# Patient Record
Sex: FEMALE | Race: WHITE | NOT HISPANIC OR LATINO | Employment: FULL TIME | URBAN - METROPOLITAN AREA
[De-identification: names, ages, dates, MRNs, and addresses within clinical notes are randomized per-mention and may not be internally consistent; named-entity substitution may affect disease eponyms.]

---

## 2020-01-02 ENCOUNTER — OFFICE VISIT (OUTPATIENT)
Dept: SURGERY | Facility: CLINIC | Age: 40
End: 2020-01-02
Payer: MEDICARE

## 2020-01-02 ENCOUNTER — TELEPHONE (OUTPATIENT)
Dept: GASTROENTEROLOGY | Facility: CLINIC | Age: 40
End: 2020-01-02

## 2020-01-02 VITALS
HEIGHT: 65 IN | DIASTOLIC BLOOD PRESSURE: 96 MMHG | WEIGHT: 238 LBS | SYSTOLIC BLOOD PRESSURE: 141 MMHG | HEART RATE: 88 BPM | BODY MASS INDEX: 39.65 KG/M2

## 2020-01-02 DIAGNOSIS — Z96.89 PRESENCE OF GASTRIC PACEMAKER: ICD-10-CM

## 2020-01-02 DIAGNOSIS — R10.84 GENERALIZED ABDOMINAL PAIN: Primary | ICD-10-CM

## 2020-01-02 DIAGNOSIS — R11.0 NAUSEA: ICD-10-CM

## 2020-01-02 PROCEDURE — 99999 PR PBB SHADOW E&M-NEW PATIENT-LVL III: ICD-10-PCS | Mod: PBBFAC,,, | Performed by: SURGERY

## 2020-01-02 PROCEDURE — 99203 OFFICE O/P NEW LOW 30 MIN: CPT | Mod: PBBFAC | Performed by: SURGERY

## 2020-01-02 PROCEDURE — 99999 PR PBB SHADOW E&M-NEW PATIENT-LVL III: CPT | Mod: PBBFAC,,, | Performed by: SURGERY

## 2020-01-02 PROCEDURE — 99204 OFFICE O/P NEW MOD 45 MIN: CPT | Mod: S$PBB,,, | Performed by: SURGERY

## 2020-01-02 PROCEDURE — 99204 PR OFFICE/OUTPT VISIT, NEW, LEVL IV, 45-59 MIN: ICD-10-PCS | Mod: S$PBB,,, | Performed by: SURGERY

## 2020-01-02 RX ORDER — ESZOPICLONE 3 MG/1
3 TABLET, FILM COATED ORAL
COMMUNITY

## 2020-01-02 RX ORDER — CETIRIZINE HYDROCHLORIDE 10 MG/1
10 TABLET ORAL
COMMUNITY

## 2020-01-02 RX ORDER — OXYCODONE AND ACETAMINOPHEN 10; 325 MG/1; MG/1
1 TABLET ORAL
COMMUNITY

## 2020-01-02 RX ORDER — ONDANSETRON 4 MG/1
4 TABLET, FILM COATED ORAL
COMMUNITY

## 2020-01-02 RX ORDER — ATORVASTATIN CALCIUM 20 MG/1
20 TABLET, FILM COATED ORAL
COMMUNITY

## 2020-01-02 RX ORDER — PREGABALIN 75 MG/1
2 CAPSULE ORAL
COMMUNITY
Start: 2018-04-20

## 2020-01-02 RX ORDER — PREGABALIN 150 MG/1
150 CAPSULE ORAL
COMMUNITY

## 2020-01-02 RX ORDER — TIZANIDINE 2 MG/1
2 TABLET ORAL
COMMUNITY

## 2020-01-02 RX ORDER — PROMETHAZINE HYDROCHLORIDE 25 MG/1
25 TABLET ORAL
COMMUNITY

## 2020-01-02 RX ORDER — TIZANIDINE 4 MG/1
4 TABLET ORAL
COMMUNITY

## 2020-01-02 NOTE — TELEPHONE ENCOUNTER
Spoke with pt and scheduled her appt with the dietitian on 01/07/2020 at 2PM. Informed pt that the dietitian is now located on the first floor by infectious disease. Pt expressed understanding.

## 2020-01-02 NOTE — Clinical Note
January 2, 2020      Cresencio Freeman MD  [address] [place] [address]   [facility]  [place] [address]           [facility]  [address]  [place] [address]  Phone: [phone]          Patient: Marilu Galarza   MR Number: [id number]   YOB: 1980   Date of Visit: 1/2/2020       Dear Dr. Cresencio Freeman:    Thank you for referring Marilu Galarza to me for evaluation. Attached you will find relevant portions of my assessment and plan of care.    If you have questions, please do not hesitate to call me. I look forward to following Marilu Galarza along with you.    Sincerely,    Librado Adams MD    Enclosure  CC:  No Recipients    If you would like to receive this communication electronically, please contact [email or web address] or [phone] to request more information on [employer] access.    For providers and/or their staff who would like to refer a patient to [facility], please contact us through our one-stop-shop provider referral line, [facility] , at [phone].    If you feel you have received this communication in error or would no longer like to receive these types of communications, please e-mail [email or web address]

## 2020-01-02 NOTE — PROGRESS NOTES
History & Physical    SUBJECTIVE:     History of Present Illness:  Patient is a 39 y.o. female with gastroparesis who initially failed medical management and subsequently underwent gastric stimulator placement in 2016 after which her symptoms improved significantly. She did not have any issues until middle of last year when she required a battery exchange, since which she has had many difficulties and return of her symptoms. Specifically she reports significant chronic abdominal pain, nausea, and early satiety as well as bloating, she does not have emesis if she takes her zofran and phenergan. She takes phenergan daily while she takes zofran every other day. She has not had any ED visits or hospitalizations for her symptoms. She does take chronic pain medications, she is on oxycodone daily. She has a surgical hx (in addition to the aforementioned) significant for cholecystectomy, LIYAH/BSO, and multiple back surgeries. Denies any other significant medical history.       Severity Frequency  Vomitin  4  Nausea 2  4  Early Satiety 4  4  Bloating 4  4  Postprandial 4  4  Fulness   Epigastric 4  4  Pain   Epigastric 4  4   Burning    Chief Complaint   Patient presents with    Consult     gastroparesis       Review of patient's allergies indicates:   Allergen Reactions    Nsaids (non-steroidal anti-inflammatory drug) Other (See Comments)     GI bleeding      Aspirin Other (See Comments)     GI bleeding  GI bleed       Hydrocodone Nausea And Vomiting    Tramadol Nausea And Vomiting    Ketorolac tromethamine Nausea And Vomiting     Nausea and vomiting         Current Outpatient Medications   Medication Sig Dispense Refill    pregabalin (LYRICA) 75 MG capsule Take 2 capsules by mouth.      atorvastatin (LIPITOR) 20 MG tablet Take 20 mg by mouth.      cetirizine (ZYRTEC) 10 MG tablet Take 10 mg by mouth.      eszopiclone (LUNESTA) 3 mg Tab Take 3 mg by mouth.      ondansetron (ZOFRAN) 4 MG tablet Take 4 mg by  "mouth.      oxyCODONE-acetaminophen (PERCOCET)  mg per tablet Take 1 tablet by mouth.      pregabalin (LYRICA) 150 MG capsule Take 150 mg by mouth.      promethazine (PHENERGAN) 25 MG tablet Take 25 mg by mouth.      tiZANidine (ZANAFLEX) 2 MG tablet Take 2 mg by mouth.      tiZANidine (ZANAFLEX) 4 MG tablet Take 4 mg by mouth.       No current facility-administered medications for this visit.        No past medical history on file.  No past surgical history on file.  No family history on file.  Social History     Tobacco Use    Smoking status: Not on file   Substance Use Topics    Alcohol use: Not on file    Drug use: Not on file        Review of Systems:  Review of Systems   Constitutional: Positive for appetite change. Negative for chills and fever.   Respiratory: Negative for chest tightness and shortness of breath.    Cardiovascular: Negative for chest pain.   Gastrointestinal: Positive for abdominal pain and nausea. Negative for constipation and vomiting.   All other systems reviewed and are negative.      OBJECTIVE:     Vital Signs (Most Recent)  Pulse: 88 (01/02/20 1445)  BP: (!) 141/96 (01/02/20 1445)  5' 5" (1.651 m)  108 kg (238 lb)     Physical Exam:  Physical Exam   Constitutional: She appears well-developed and well-nourished.   HENT:   Head: Normocephalic and atraumatic.   Eyes: EOM are normal. No scleral icterus.   Cardiovascular: Normal rate.   Pulmonary/Chest: Effort normal. No respiratory distress.   Abdominal: Soft. She exhibits no distension. There is tenderness (mild tenderness at pocket site).   Psychiatric: She has a normal mood and affect. Her behavior is normal.       Laboratory  None to review    Diagnostic Results:  None to review    ASSESSMENT/PLAN:     Marilu Galarza is a 39 y.o. female with gastroparesis s/p gastric stimulator placement in 2016, now complicated by lead failure and recurrence of symptoms    Plan to obtain records from past medical providers  Will obtain our " own EGD, gastric emptying study, and CT abdomen/pelvis  Refer to GI nutrition  Would like her off narcotics  Follow up after aforementioned has been done/obtained    Janusz Escoto MD PGY V  [phone]

## 2020-01-02 NOTE — LETTER
[facility] - General Surgery  [address]  [facility] [address]  Phone: [phone] January 2, 2020      Cresencio Freeman MD  [address] [place] [address]   [facility]  [place] [address]    Patient: Marilu Galarza   MR Number: [id number]   YOB: 1980   Date of Visit: 1/2/2020     Dear Dr. Freeman:    Thank you for referring Marilu Galarza to me for evaluation. Attached you will find relevant portions of my assessment and plan of care.    Ms. Galarza is status post gastric stimulator that worked great until recently; replaced and with one lead not working.  She has extremely severe symptoms.  She is taking phenergan, zofran and narcotics.      Gastric Stimulator Interrogation: impedence 367 Voltage 2.5 Current 6.2 Pulse [id number] Rate 55 Cycle on 4 Cycle off 1.      She is to get off narcotics and see GI nutrition.  We will obtain GES, EGD and CT.  Once this has started, we will plan for lead replacement.    If you have questions, please do not hesitate to call me. I look forward to following Marilu Galarza along with you.    Sincerely,      Librado Adams MD  Professor, [employer]  Section Head, General Surgery  [facility]    WSR/afw

## 2020-01-02 NOTE — TELEPHONE ENCOUNTER
Left vm instructing pt to call back to schedule an appt      ----- Message from Marilynn Parisi sent at 1/2/2020  3:34 PM CST -----  Contact: referring doctor nurse  Please call above patient at [phone] need appointment waiting on a call back thanks.

## 2020-01-02 NOTE — PROGRESS NOTES
I have seen the patient, reviewed the Resident's history and physical, assessment and plan. I have personally interviewed and examined the patient at bedside and: agree with the findings.     S/p gastric stimulator that worked great until recently.  Replaced and with one lead not working.  She has extremely severe symptoms.  She is taking phenergan, zofran and narcotics.  Gastric Stimulator Interrogation: impedence 367 Voltage 2.5 Current 6.2 Pulse [id number] Rate 55 Cycle on 4 Cycle off 1.  Get off narcotics.  To see GI nutrition.  Obtain ges, egd and ct.  Once this has started for lead replacement.

## 2020-01-13 ENCOUNTER — TELEPHONE (OUTPATIENT)
Dept: SURGERY | Facility: CLINIC | Age: 40
End: 2020-01-13

## 2020-01-13 NOTE — TELEPHONE ENCOUNTER
Left VM notifying pt that we have her CT scan scheduled at [facility] on 1/20/20 @ 0900.  Pt to arrive for 8am and should have nothing to eat or drink after midnight.   Asked pt to return phone call to confirm VM.

## 2020-01-16 ENCOUNTER — HOSPITAL ENCOUNTER (OUTPATIENT)
Dept: RADIOLOGY | Facility: HOSPITAL | Age: 40
Discharge: HOME | End: 2020-01-16
Attending: SURGERY
Payer: MEDICARE

## 2020-01-16 DIAGNOSIS — R11.0 NAUSEA: ICD-10-CM

## 2020-01-16 DIAGNOSIS — R10.84 GENERALIZED ABDOMINAL PAIN: ICD-10-CM

## 2020-01-16 DIAGNOSIS — Z96.89 PRESENCE OF GASTRIC PACEMAKER: ICD-10-CM

## 2020-01-16 PROCEDURE — 78264 GASTRIC EMPTYING IMG STUDY: CPT | Mod: TC

## 2020-01-16 PROCEDURE — 78264 NM GASTRIC EMPTYING: ICD-10-PCS | Mod: 26,,, | Performed by: RADIOLOGY

## 2020-01-16 PROCEDURE — 78264 GASTRIC EMPTYING IMG STUDY: CPT | Mod: 26,,, | Performed by: RADIOLOGY

## 2020-01-23 ENCOUNTER — ANESTHESIA EVENT (OUTPATIENT)
Dept: ENDOSCOPY | Facility: HOSPITAL | Age: 40
End: 2020-01-23
Payer: MEDICARE

## 2020-01-23 ENCOUNTER — HOSPITAL ENCOUNTER (OUTPATIENT)
Facility: HOSPITAL | Age: 40
Discharge: HOME | End: 2020-01-23
Attending: INTERNAL MEDICINE | Admitting: INTERNAL MEDICINE
Payer: MEDICARE

## 2020-01-23 ENCOUNTER — ANESTHESIA (OUTPATIENT)
Dept: ENDOSCOPY | Facility: HOSPITAL | Age: 40
End: 2020-01-23
Payer: MEDICARE

## 2020-01-23 VITALS
RESPIRATION RATE: 12 BRPM | OXYGEN SATURATION: 98 % | SYSTOLIC BLOOD PRESSURE: 120 MMHG | WEIGHT: 259 LBS | BODY MASS INDEX: 43.15 KG/M2 | TEMPERATURE: 99 F | DIASTOLIC BLOOD PRESSURE: 90 MMHG | HEIGHT: 65 IN | HEART RATE: 79 BPM

## 2020-01-23 DIAGNOSIS — R10.84 ABDOMINAL PAIN, GENERALIZED: Primary | ICD-10-CM

## 2020-01-23 DIAGNOSIS — G89.29 CHRONIC ABDOMINAL PAIN: ICD-10-CM

## 2020-01-23 DIAGNOSIS — R10.9 CHRONIC ABDOMINAL PAIN: ICD-10-CM

## 2020-01-23 PROCEDURE — 63600175 PHARM REV CODE 636 W HCPCS: Performed by: NURSE ANESTHETIST, CERTIFIED REGISTERED

## 2020-01-23 PROCEDURE — E9220 PRA ENDO ANESTHESIA: ICD-10-PCS | Mod: ,,, | Performed by: NURSE ANESTHETIST, CERTIFIED REGISTERED

## 2020-01-23 PROCEDURE — 25000003 PHARM REV CODE 250: Performed by: NURSE ANESTHETIST, CERTIFIED REGISTERED

## 2020-01-23 PROCEDURE — 37000008 HC ANESTHESIA 1ST 15 MINUTES: Performed by: INTERNAL MEDICINE

## 2020-01-23 PROCEDURE — 88305 TISSUE EXAM BY PATHOLOGIST: ICD-10-PCS | Mod: 26,,, | Performed by: PATHOLOGY

## 2020-01-23 PROCEDURE — 27201012 HC FORCEPS, HOT/COLD, DISP: Performed by: INTERNAL MEDICINE

## 2020-01-23 PROCEDURE — 37000009 HC ANESTHESIA EA ADD 15 MINS: Performed by: INTERNAL MEDICINE

## 2020-01-23 PROCEDURE — 88342 IMHCHEM/IMCYTCHM 1ST ANTB: CPT | Performed by: PATHOLOGY

## 2020-01-23 PROCEDURE — 88342 IMHCHEM/IMCYTCHM 1ST ANTB: CPT | Mod: 26,,, | Performed by: PATHOLOGY

## 2020-01-23 PROCEDURE — 88342 CHG IMMUNOCYTOCHEMISTRY: ICD-10-PCS | Mod: 26,,, | Performed by: PATHOLOGY

## 2020-01-23 PROCEDURE — 63600175 PHARM REV CODE 636 W HCPCS: Performed by: INTERNAL MEDICINE

## 2020-01-23 PROCEDURE — 88305 TISSUE EXAM BY PATHOLOGIST: CPT | Mod: 26,,, | Performed by: PATHOLOGY

## 2020-01-23 PROCEDURE — 43239 PR EGD, FLEX, W/BIOPSY, SGL/MULTI: ICD-10-PCS | Mod: ,,, | Performed by: INTERNAL MEDICINE

## 2020-01-23 PROCEDURE — 43239 EGD BIOPSY SINGLE/MULTIPLE: CPT | Performed by: INTERNAL MEDICINE

## 2020-01-23 PROCEDURE — 43239 EGD BIOPSY SINGLE/MULTIPLE: CPT | Mod: ,,, | Performed by: INTERNAL MEDICINE

## 2020-01-23 PROCEDURE — 88305 TISSUE EXAM BY PATHOLOGIST: CPT | Performed by: PATHOLOGY

## 2020-01-23 PROCEDURE — E9220 PRA ENDO ANESTHESIA: HCPCS | Mod: ,,, | Performed by: NURSE ANESTHETIST, CERTIFIED REGISTERED

## 2020-01-23 RX ORDER — LIDOCAINE HCL/PF 100 MG/5ML
SYRINGE (ML) INTRAVENOUS
Status: DISCONTINUED | OUTPATIENT
Start: 2020-01-23 | End: 2020-01-23

## 2020-01-23 RX ORDER — PROPOFOL 10 MG/ML
VIAL (ML) INTRAVENOUS CONTINUOUS PRN
Status: DISCONTINUED | OUTPATIENT
Start: 2020-01-23 | End: 2020-01-23

## 2020-01-23 RX ORDER — SODIUM CHLORIDE 9 MG/ML
INJECTION, SOLUTION INTRAVENOUS CONTINUOUS
Status: DISCONTINUED | OUTPATIENT
Start: 2020-01-23 | End: 2020-01-23 | Stop reason: HOSPADM

## 2020-01-23 RX ORDER — SODIUM CHLORIDE 9 MG/ML
INJECTION, SOLUTION INTRAVENOUS CONTINUOUS
Status: CANCELLED | OUTPATIENT
Start: 2020-01-23

## 2020-01-23 RX ORDER — PROPOFOL 10 MG/ML
VIAL (ML) INTRAVENOUS
Status: DISCONTINUED | OUTPATIENT
Start: 2020-01-23 | End: 2020-01-23

## 2020-01-23 RX ORDER — GLYCOPYRROLATE 0.2 MG/ML
INJECTION INTRAMUSCULAR; INTRAVENOUS
Status: DISCONTINUED | OUTPATIENT
Start: 2020-01-23 | End: 2020-01-23

## 2020-01-23 RX ADMIN — PROPOFOL 100 MG: 10 INJECTION, EMULSION INTRAVENOUS at 02:01

## 2020-01-23 RX ADMIN — SODIUM CHLORIDE: 0.9 INJECTION, SOLUTION INTRAVENOUS at 02:01

## 2020-01-23 RX ADMIN — PROPOFOL 200 MCG/KG/MIN: 10 INJECTION, EMULSION INTRAVENOUS at 02:01

## 2020-01-23 RX ADMIN — GLYCOPYRROLATE 0.2 MG: 0.2 INJECTION, SOLUTION INTRAMUSCULAR; INTRAVENOUS at 02:01

## 2020-01-23 RX ADMIN — Medication 100 MG: at 02:01

## 2020-01-23 NOTE — ANESTHESIA POSTPROCEDURE EVALUATION
Anesthesia Post Evaluation    Patient: Marilu Galarza    Procedure(s) Performed: Procedure(s) (LRB):  EGD (ESOPHAGOGASTRODUODENOSCOPY) (N/A)    Final Anesthesia Type: general    Patient location during evaluation: GI PACU  Patient participation: Yes- Able to Participate  Level of consciousness: awake and alert  Post-procedure vital signs: reviewed and stable  Pain management: adequate  Airway patency: patent    PONV status at discharge: No PONV  Anesthetic complications: no      Cardiovascular status: blood pressure returned to baseline  Respiratory status: unassisted, spontaneous ventilation and room air  Hydration status: euvolemic  Follow-up not needed.          Vitals Value Taken Time   /90 1/23/2020  3:32 PM   Temp 37.2 °C (98.9 °F) 1/23/2020  3:09 PM   Pulse 79 1/23/2020  3:32 PM   Resp 12 1/23/2020  3:32 PM   SpO2 98 % 1/23/2020  3:32 PM         Event Time     Out of Recovery 15:34:09          Pain/Jacinta Score: Jacinta Score: 10 (1/23/2020  3:33 PM)

## 2020-01-23 NOTE — ANESTHESIA PREPROCEDURE EVALUATION
01/23/2020  Marilu Galarza is a 39 y.o., female.    Anesthesia Evaluation    I have reviewed the Patient Summary Reports.     I have reviewed the Medications.     Review of Systems  Anesthesia Hx:  Denies Family Hx of Anesthesia complications.   Denies Personal Hx of Anesthesia complications.   Hematology/Oncology:  Hematology Normal   Oncology Normal     EENT/Dental:EENT/Dental Normal   Cardiovascular:  Cardiovascular Normal     Pulmonary:  Pulmonary Normal    Renal/:  Renal/ Normal     Hepatic/GI:  Hepatic/GI Normal    Musculoskeletal:  Musculoskeletal Normal    Neurological:  Neurology Normal    Endocrine:  Endocrine Normal    Dermatological:  Skin Normal    Psych:  Psychiatric Normal           Physical Exam  General:  Well nourished    Airway/Jaw/Neck:  AIRWAY FINDINGS: Normal      Eyes/Ears/Nose:  EYES/EARS/NOSE FINDINGS: Normal   Dental:  DENTAL FINDINGS: Normal   Chest/Lungs:  Chest/Lungs Clear    Heart/Vascular:  Heart Findings: Normal Heart murmur: negative Vascular Findings: Normal    Abdomen:  Abdomen Findings: Normal    Musculoskeletal:  Musculoskeletal Findings: Normal   Skin:  Skin Findings: Normal    Mental Status:  Mental Status Findings: Normal        Anesthesia Plan  Type of Anesthesia, risks & benefits discussed:  Anesthesia Type:  general  Patient's Preference:   Intra-op Monitoring Plan: standard ASA monitors  Intra-op Monitoring Plan Comments:   Post Op Pain Control Plan:   Post Op Pain Control Plan Comments:   Induction:   IV  Beta Blocker:  Patient is not currently on a Beta-Blocker (No further documentation required).       Informed Consent: Patient understands risks and agrees with Anesthesia plan.  Questions answered. Anesthesia consent signed with patient.  ASA Score: 2     Day of Surgery Review of History & Physical:    H&P update referred to the provider.         Ready For  Surgery From Anesthesia Perspective.

## 2020-01-23 NOTE — H&P
[facility]  History & Physical    Subjective:      Chief Complaint/Reason for Admission: abdominal pain    Marilu Galarza is a 39 y.o. female.    Past Medical History:   Diagnosis Date    Asthma     Hypotension 2018     Past Surgical History:   Procedure Laterality Date    BACK SURGERY       SECTION N/A     CHOLECYSTECTOMY N/A     GASTRIC STIMULATOR IMPLANT SURGERY N/A     HYSTERECTOMY      NASAL RECONSTRUCTION N/A      History reviewed. No pertinent family history.  Social History     Tobacco Use    Smoking status: Current Every Day Smoker     Packs/day: 1.00     Years: 10.00     Pack years: 10.00     Types: Cigarettes   Substance Use Topics    Alcohol use: Not Currently    Drug use: Not on file       PTA Medications   Medication Sig    atorvastatin (LIPITOR) 20 MG tablet Take 20 mg by mouth.    cetirizine (ZYRTEC) 10 MG tablet Take 10 mg by mouth.    eszopiclone (LUNESTA) 3 mg Tab Take 3 mg by mouth.    oxyCODONE-acetaminophen (PERCOCET)  mg per tablet Take 1 tablet by mouth.    pregabalin (LYRICA) 150 MG capsule Take 150 mg by mouth.    tiZANidine (ZANAFLEX) 4 MG tablet Take 4 mg by mouth.    ondansetron (ZOFRAN) 4 MG tablet Take 4 mg by mouth.    pregabalin (LYRICA) 75 MG capsule Take 2 capsules by mouth.    promethazine (PHENERGAN) 25 MG tablet Take 25 mg by mouth.    tiZANidine (ZANAFLEX) 2 MG tablet Take 2 mg by mouth.     Review of patient's allergies indicates:   Allergen Reactions    Nsaids (non-steroidal anti-inflammatory drug) Other (See Comments)     GI bleeding      Aspirin Other (See Comments)     GI bleeding  GI bleed       Hydrocodone Nausea And Vomiting    Tramadol Nausea And Vomiting    Ketorolac tromethamine Nausea And Vomiting     Nausea and vomiting          Review of Systems   Respiratory: Negative.    Cardiovascular: Negative.        Objective:      Vital Signs (Most Recent)  Temp: 98.2 °F (36.8 °C) (20 1425)  Pulse: 90 (20  1425)  Resp: 18 (01/23/20 1425)  BP: (!) 139/95 (01/23/20 1425)  SpO2: 97 % (01/23/20 1425)    Vital Signs Range (Last 24H):  Temp:  [98.2 °F (36.8 °C)]   Pulse:  [90]   Resp:  [18]   BP: (139)/(95)   SpO2:  [97 %]     Physical Exam   Cardiovascular: Normal rate and regular rhythm.   Pulmonary/Chest: Effort normal and breath sounds normal.       Data Review:     ECG: .     Assessment:      There are no hospital problems to display for this patient.      Plan:    Indication for procedure:    ASA:II  Airway normal  Malampati class:per anes    Personal and family history negative for anesthesia problems    Plan:egd  Anesthesia plan: general

## 2020-01-23 NOTE — DISCHARGE INSTRUCTIONS

## 2020-01-23 NOTE — TRANSFER OF CARE
"Anesthesia Transfer of Care Note    Patient: Marilu Galarza    Procedure(s) Performed: Procedure(s) (LRB):  EGD (ESOPHAGOGASTRODUODENOSCOPY) (N/A)    Patient location: GI    Anesthesia Type: general    Transport from OR: Transported from OR on room air with adequate spontaneous ventilation    Post pain: adequate analgesia    Post assessment: no apparent anesthetic complications and tolerated procedure well    Post vital signs: stable    Level of consciousness: awake and alert    Nausea/Vomiting: no nausea/vomiting    Complications: none    Transfer of care protocol was followed      Last vitals:   Visit Vitals  /75 (BP Location: Left arm, Patient Position: Lying)   Pulse 110   Temp 37.2 °C (98.9 °F) (Skin)   Resp 12   Ht 5' 5" (1.651 m)   Wt 117.5 kg (259 lb)   SpO2 98%   BMI 43.10 kg/m²     "

## 2020-01-23 NOTE — [employer] PATIENT INSTRUCTIONS
Discharge Summary/Instructions after an Endoscopic Procedure  Patient Name: Marilu Galarza  Patient MRN: [id number]  Patient YOB: 1980 Thursday, January 23, 2020  Brennan Rose MD  RESTRICTIONS:  During your procedure today, you received medications for sedation.  These   medications may affect your judgment, balance and coordination.  Therefore,   for 24 hours, you have the following restrictions:   - DO NOT drive a car, operate machinery, make legal/financial decisions,   sign important papers or drink alcohol.    ACTIVITY:  Today: no heavy lifting, straining or running due to procedural   sedation/anesthesia.  The following day: return to full activity including work.  DIET:  Eat and drink normally unless instructed otherwise.     TREATMENT FOR COMMON SIDE EFFECTS:  - Mild abdominal pain, nausea, belching, bloating or excessive gas:  rest,   eat lightly and use a heating pad.  - Sore Throat: treat with throat lozenges and/or gargle with warm salt   water.  - Because air was used during the procedure, expelling large amounts of air   from your rectum or belching is normal.  - If a bowel prep was taken, you may not have a bowel movement for 1-3 days.    This is normal.  SYMPTOMS TO WATCH FOR AND REPORT TO YOUR PHYSICIAN:  1. Abdominal pain or bloating, other than gas cramps.  2. Chest pain.  3. Back pain.  4. Signs of infection such as: chills or fever occurring within 24 hours   after the procedure.  5. Rectal bleeding, which would show as bright red, maroon, or black stools.   (A tablespoon of blood from the rectum is not serious, especially if   hemorrhoids are present.)  6. Vomiting.  7. Weakness or dizziness.  GO DIRECTLY TO THE NEAREST EMERGENCY ROOM IF YOU HAVE ANY OF THE FOLLOWING:      Difficulty breathing              Chills and/or fever over 101 F   Persistent vomiting and/or vomiting blood   Severe abdominal pain   Severe chest pain   Black, tarry stools   Bleeding- more than one  tablespoon   Any other symptom or condition that you feel may need urgent attention  Your doctor recommends these additional instructions:  If any biopsies were taken, your doctors clinic will contact you in 1 to 2   weeks with any results.  - Patient has a contact number available for emergencies.  The signs and   symptoms of potential delayed complications were discussed with the   patient.  Return to normal activities tomorrow.  Written discharge   instructions were provided to the patient.   - Discharge patient to home (ambulatory).   - Resume previous diet.   - Continue present medications.   - Await pathology results.   - Return to referring physician after studies are complete.  For questions, problems or results please call your physician - Brennan Rose MD at Work:  [phone].  [facility] [place], EMERGENCY ROOM PHONE NUMBER: [phone]  IF A COMPLICATION OR EMERGENCY SITUATION ARISES AND YOU ARE UNABLE TO REACH   YOUR PHYSICIAN - GO DIRECTLY TO THE EMERGENCY ROOM.  Brennan Rose MD  1/23/2020 3:05:09 PM  This report has been verified and signed electronically.  [employer]

## 2020-01-31 LAB
FINAL PATHOLOGIC DIAGNOSIS: NORMAL
GROSS: NORMAL

## 2020-02-03 ENCOUNTER — TELEPHONE (OUTPATIENT)
Dept: GASTROENTEROLOGY | Facility: CLINIC | Age: 40
End: 2020-02-03

## 2020-02-03 NOTE — TELEPHONE ENCOUNTER
----- Message from Brennan Rose MD sent at 2/1/2020  9:58 AM CST -----  Please call , stomach biopsies showed some mild inflammation, but nothing serious and no bacteria

## 2020-02-03 NOTE — TELEPHONE ENCOUNTER
----- Message from Yousif Lopes sent at 2/3/2020  8:25 AM CST -----  Contact: PT -[phone]  RAFAEL MELGAR-YOUSIF-Roberto just missed your call.  Please try again.  Thanks.

## 2020-02-05 ENCOUNTER — PATIENT MESSAGE (OUTPATIENT)
Dept: SURGERY | Facility: CLINIC | Age: 40
End: 2020-02-05

## 2020-02-10 ENCOUNTER — TELEPHONE (OUTPATIENT)
Dept: SURGERY | Facility: CLINIC | Age: 40
End: 2020-02-10

## 2020-02-10 NOTE — TELEPHONE ENCOUNTER
R/t pt's call and told her that she can come in for an appt to discuss results. Asked pt if she had d/c narcs; pt stated that she is working on it and down to 1-2 tablets/week. Asked pt if she had appt with GI nutrition yet and she stated that she has to r/s, but has been sticking to her previous liquid diet that was given to her. Instructed pt to continue to work on these things. Understanding verbalized.    ----- Message from Ericka Coleman sent at 2/10/2020  1:25 PM CST -----  Contact: pt @[phone]  Pt called in to speak with the nurse in Dr. Adams's office regarding results and the next appt. Please call back @ [phone]

## 2020-02-20 ENCOUNTER — OFFICE VISIT (OUTPATIENT)
Dept: SURGERY | Facility: CLINIC | Age: 40
End: 2020-02-20
Payer: MEDICARE

## 2020-02-20 VITALS
HEIGHT: 65 IN | WEIGHT: 259 LBS | HEART RATE: 84 BPM | SYSTOLIC BLOOD PRESSURE: 142 MMHG | DIASTOLIC BLOOD PRESSURE: 90 MMHG | BODY MASS INDEX: 43.15 KG/M2

## 2020-02-20 DIAGNOSIS — K31.84 GASTROPARESIS: ICD-10-CM

## 2020-02-20 PROBLEM — M54.9 CHRONIC BACK PAIN: Status: ACTIVE | Noted: 2020-02-20

## 2020-02-20 PROBLEM — G89.29 CHRONIC BACK PAIN: Status: ACTIVE | Noted: 2020-02-20

## 2020-02-20 PROCEDURE — 99213 OFFICE O/P EST LOW 20 MIN: CPT | Mod: S$PBB,,, | Performed by: SURGERY

## 2020-02-20 PROCEDURE — 99213 PR OFFICE/OUTPT VISIT, EST, LEVL III, 20-29 MIN: ICD-10-PCS | Mod: S$PBB,,, | Performed by: SURGERY

## 2020-02-20 PROCEDURE — 99999 PR PBB SHADOW E&M-EST. PATIENT-LVL III: CPT | Mod: PBBFAC,,, | Performed by: SURGERY

## 2020-02-20 PROCEDURE — 99999 PR PBB SHADOW E&M-EST. PATIENT-LVL III: ICD-10-PCS | Mod: PBBFAC,,, | Performed by: SURGERY

## 2020-02-20 PROCEDURE — 99213 OFFICE O/P EST LOW 20 MIN: CPT | Mod: PBBFAC | Performed by: SURGERY

## 2020-02-20 NOTE — PROGRESS NOTES
History & Physical    SUBJECTIVE:     History of Present Illness:  Patient is a 39 y.o. female with gastroparesis who initially failed medical management and subsequently underwent gastric stimulator placement in 2016 after which her symptoms improved significantly. She did not have any issues until middle of last year when she required a battery exchange, since which she has had many difficulties and return of her symptoms. Specifically she reports significant chronic abdominal pain, nausea, and early satiety as well as bloating, she does not have emesis if she takes her zofran and phenergan. She takes phenergan daily while she takes zofran every other day. She has not had any ED visits or hospitalizations for her symptoms. She does take chronic pain medications, she is on oxycodone daily. She has a surgical hx (in addition to the aforementioned) significant for cholecystectomy, LIYAH/BSO, and multiple back surgeries. Denies any other significant medical history.       Severity Frequency  Vomitin  4  Nausea 2  4  Early Satiety 4  4  Bloating 4  4  Postprandial 4  4  Fulness   Epigastric 4  4  Pain   Epigastric 4  4   Burning    Interval History 2020: Patient reports little subjective improvement in symptoms since last month. Is here today for review of test results. Has additional complaint of gastric stimulator pocket protruding and causing some pain.  Gastric Symptom Monitor Worksheet:  Vomiting 2, 2  Nausea 2, 3  Early Satiety 3, 4  Bloating 3, 4  Postprandial Fullness 2, 4  Epigastric pain 2, 4  Epigastric Burning 2, 4        Chief Complaint   Patient presents with    Follow-up       Review of patient's allergies indicates:   Allergen Reactions    Nsaids (non-steroidal anti-inflammatory drug) Other (See Comments)     GI bleeding      Aspirin Other (See Comments)     GI bleeding  GI bleed       Hydrocodone Nausea And Vomiting    Tramadol Nausea And Vomiting    Ketorolac tromethamine Nausea And  Vomiting     Nausea and vomiting         Current Outpatient Medications   Medication Sig Dispense Refill    atorvastatin (LIPITOR) 20 MG tablet Take 20 mg by mouth.      cetirizine (ZYRTEC) 10 MG tablet Take 10 mg by mouth.      eszopiclone (LUNESTA) 3 mg Tab Take 3 mg by mouth.      ondansetron (ZOFRAN) 4 MG tablet Take 4 mg by mouth.      oxyCODONE-acetaminophen (PERCOCET)  mg per tablet Take 1 tablet by mouth.      pregabalin (LYRICA) 150 MG capsule Take 150 mg by mouth.      pregabalin (LYRICA) 75 MG capsule Take 2 capsules by mouth.      promethazine (PHENERGAN) 25 MG tablet Take 25 mg by mouth.      tiZANidine (ZANAFLEX) 2 MG tablet Take 2 mg by mouth.      tiZANidine (ZANAFLEX) 4 MG tablet Take 4 mg by mouth.       No current facility-administered medications for this visit.        Past Medical History:   Diagnosis Date    Asthma     Hypotension      Past Surgical History:   Procedure Laterality Date    BACK SURGERY       SECTION N/A     CHOLECYSTECTOMY N/A     ESOPHAGOGASTRODUODENOSCOPY N/A 2020    Procedure: EGD (ESOPHAGOGASTRODUODENOSCOPY);  Surgeon: Brennan Rose MD;  Location: [facility] ([address] [place] [address]);  Service: Endoscopy;  Laterality: N/A;  gastric stimulator  gastroparesis, 3 days full liquid diet-4th floor per Endo protocol    GASTRIC STIMULATOR IMPLANT SURGERY N/A     HYSTERECTOMY      NASAL RECONSTRUCTION N/A      No family history on file.  Social History     Tobacco Use    Smoking status: Current Every Day Smoker     Packs/day: 1.00     Years: 10.00     Pack years: 10.00     Types: Cigarettes   Substance Use Topics    Alcohol use: Not Currently    Drug use: Not on file        Review of Systems:  Review of Systems   Constitutional: Positive for appetite change. Negative for chills and fever.   Respiratory: Negative for chest tightness and shortness of breath.    Cardiovascular: Negative for chest pain.   Gastrointestinal: Positive for abdominal pain and  "nausea. Negative for constipation and vomiting.   All other systems reviewed and are negative.      OBJECTIVE:     Vital Signs (Most Recent)  Pulse: 84 (02/20/20 1654)  BP: (!) 142/90 (02/20/20 1654)  5' 5" (1.651 m)  117.5 kg (259 lb)     Physical Exam:  Physical Exam   Constitutional: She appears well-developed and well-nourished.   HENT:   Head: Normocephalic and atraumatic.   Eyes: EOM are normal. No scleral icterus.   Cardiovascular: Normal rate.   Pulmonary/Chest: Effort normal. No respiratory distress.   Abdominal: Soft. She exhibits no distension. There is tenderness (mild tenderness at pocket site).   Psychiatric: She has a normal mood and affect. Her behavior is normal.       Laboratory  None to review    Diagnostic Results:  NM Gastric Emptying Study  FINDINGS:  At 4 hours the percentage of retention is 23 % (normal retention at 4 hours is 10% and lower).      Impression       Delayed gastric emptying, suggestive of gastroparesis.     Upper GI Endoscopy:  Findings:       A small hiatal hernia was present. no esophagitis       subepithelial hemorrhage with pinpoint heme without obvious        erosions, Biopsies were taken with a cold forceps for histology.       The examined duodenum was normal.  Impression:           - Small hiatal hernia.                        - Normal examined duodenum.                        no cause for abdominal pain found-    ASSESSMENT/PLAN:       "

## 2020-02-21 NOTE — PROGRESS NOTES
I have seen the patient, reviewed the Student's history and physical, assessment and plan. I have personally interviewed and examined the patient at bedside and: agree with the findings.     S/p gastric stimulator that worked great until recently.  Replaced and with one lead not working.  She has extremely severe symptoms.  She is on a full liquid diet.   She is taking phenergan, zofran and narcotics (has had a lot of back surgery and lost a sister to opioid addiction. Usually takes 1/2 pill a week (at first I thought she said once a day but she later clarified this)). Last visit we interrogated the stimulator and voltage was low due to prior shocking.  GES abnormal, EGD small hh, CT unremarkable.  I suggest she work with pain management and addiction psych to get off narcotics prior to any further surgery for gastroparesis (replacement leads and stimulator) preferably for about 3 months.  She is concerned about trying to as she cannot take many types of medications for pain and cannot get any more injections for pain and says she needs the narcotics to take care of her family.  She is very unhappy with my treatment.  She told me she was suing her last doctor and threatened to dale me.

## 2020-03-03 NOTE — PROGRESS NOTES
Narx scores: narcotics 502, sedatives 452, stimulants 0.   Oxy 10s at 90 a month for the last few months but last year 60 per month.  All rx by one provider.